# Patient Record
Sex: FEMALE | Race: WHITE | URBAN - METROPOLITAN AREA
[De-identification: names, ages, dates, MRNs, and addresses within clinical notes are randomized per-mention and may not be internally consistent; named-entity substitution may affect disease eponyms.]

---

## 2022-08-15 ENCOUNTER — APPOINTMENT (RX ONLY)
Dept: URBAN - METROPOLITAN AREA CLINIC 49 | Facility: CLINIC | Age: 42
Setting detail: DERMATOLOGY
End: 2022-08-15

## 2022-08-15 DIAGNOSIS — L72.0 EPIDERMAL CYST: ICD-10-CM

## 2022-08-15 PROCEDURE — 99203 OFFICE O/P NEW LOW 30 MIN: CPT

## 2022-08-15 PROCEDURE — ? COUNSELING

## 2022-08-15 PROCEDURE — ? PRESCRIPTION MEDICATION MANAGEMENT

## 2022-08-15 PROCEDURE — ? ORDER TESTS

## 2022-08-15 PROCEDURE — ? FULL BODY SKIN EXAM - DECLINED

## 2022-08-15 ASSESSMENT — LOCATION SIMPLE DESCRIPTION DERM: LOCATION SIMPLE: RIGHT BREAST

## 2022-08-15 ASSESSMENT — LOCATION ZONE DERM: LOCATION ZONE: TRUNK

## 2022-08-15 ASSESSMENT — LOCATION DETAILED DESCRIPTION DERM: LOCATION DETAILED: RIGHT MEDIAL BREAST 4-5:00 REGION

## 2022-08-15 NOTE — HPI: SKIN LESION
What Type Of Note Output Would You Prefer (Optional)?: Standard Output
How Severe Is Your Skin Lesion?: mild
Has Your Skin Lesion Been Treated?: been treated
Is This A New Presentation, Or A Follow-Up?: Skin Lesion
When Was It Treated?: 8/12/2022
Additional History: Patient states she has a cyst on right breast - she saw urgent care over the weekend where they gave her doxycycline and told her to F/U with derm.

## 2022-08-15 NOTE — PROCEDURE: ORDER TESTS
Performing Laboratory: [id number]
Expected Date Of Service: 08/15/2022
Lab Facility: [id number]
Bill For Surgical Tray: no
Billing Type: Third-Party Bill

## 2022-08-15 NOTE — PROCEDURE: PRESCRIPTION MEDICATION MANAGEMENT
Render In Strict Bullet Format?: No
Continue Regimen: Doxycycline as directed from urgent care
Detail Level: Zone